# Patient Record
Sex: MALE | Race: WHITE | Employment: FULL TIME | URBAN - METROPOLITAN AREA
[De-identification: names, ages, dates, MRNs, and addresses within clinical notes are randomized per-mention and may not be internally consistent; named-entity substitution may affect disease eponyms.]

---

## 2017-11-15 ENCOUNTER — HOSPITAL ENCOUNTER (OUTPATIENT)
Age: 22
Discharge: HOME | End: 2017-11-15
Attending: FAMILY MEDICINE
Payer: COMMERCIAL

## 2017-11-15 VITALS
HEART RATE: 81 BPM | RESPIRATION RATE: 18 BRPM | HEIGHT: 75 IN | DIASTOLIC BLOOD PRESSURE: 69 MMHG | TEMPERATURE: 99 F | WEIGHT: 180 LBS | BODY MASS INDEX: 22.38 KG/M2 | SYSTOLIC BLOOD PRESSURE: 134 MMHG | OXYGEN SATURATION: 99 %

## 2017-11-15 DIAGNOSIS — R19.7 DIARRHEA, UNSPECIFIED TYPE: Primary | ICD-10-CM

## 2017-11-15 PROCEDURE — 99202 OFFICE O/P NEW SF 15 MIN: CPT

## 2017-11-16 NOTE — ED INITIAL ASSESSMENT (HPI)
Per pt having intermittent loose stools since July denies any travel. Pt reports bloating but denies any pain. Pt denies nausea or vomiting.

## 2017-11-16 NOTE — ED PROVIDER NOTES
[facility] [address] [address] [address]    History   Patient presents with:  Nausea/Vomiting/Diarrhea (gastrointestinal)    Stated Complaint: Diarrahea, insomnia, fatigue     HPI    Reports gradually onset of diarrhea 6 months ago. reactive to light. Lids are everted and swept, no foreign bodies found. Right eye exhibits no chemosis. Neck: Trachea normal, normal range of motion and full passive range of motion without pain. No thyromegaly present.    Cardiovascular: Normal rate, reg

## 2017-11-16 NOTE — ED NOTES
All orders complete pt leaving IC stable no acute distress noted pt verbalizes DC and follow up instructions

## 2017-11-21 ENCOUNTER — LAB ENCOUNTER (OUTPATIENT)
Dept: LAB | Facility: REFERENCE LAB | Age: 22
End: 2017-11-21
Attending: FAMILY MEDICINE
Payer: COMMERCIAL

## 2017-11-21 ENCOUNTER — OFFICE VISIT (OUTPATIENT)
Dept: FAMILY MEDICINE CLINIC | Facility: CLINIC | Age: 22
End: 2017-11-21

## 2017-11-21 VITALS
BODY MASS INDEX: 23.62 KG/M2 | HEART RATE: 84 BPM | OXYGEN SATURATION: 98 % | RESPIRATION RATE: 17 BRPM | TEMPERATURE: 98 F | WEIGHT: 190 LBS | HEIGHT: 75 IN | SYSTOLIC BLOOD PRESSURE: 130 MMHG | DIASTOLIC BLOOD PRESSURE: 62 MMHG

## 2017-11-21 DIAGNOSIS — R53.83 FATIGUE, UNSPECIFIED TYPE: ICD-10-CM

## 2017-11-21 DIAGNOSIS — G47.00 INSOMNIA, UNSPECIFIED TYPE: ICD-10-CM

## 2017-11-21 DIAGNOSIS — R10.9 ABDOMINAL PAIN, UNSPECIFIED ABDOMINAL LOCATION: ICD-10-CM

## 2017-11-21 DIAGNOSIS — R19.7 DIARRHEA, UNSPECIFIED TYPE: ICD-10-CM

## 2017-11-21 DIAGNOSIS — R19.7 DIARRHEA, UNSPECIFIED TYPE: Primary | ICD-10-CM

## 2017-11-21 PROCEDURE — 85652 RBC SED RATE AUTOMATED: CPT

## 2017-11-21 PROCEDURE — 86001 ALLERGEN SPECIFIC IGG: CPT

## 2017-11-21 PROCEDURE — 36415 COLL VENOUS BLD VENIPUNCTURE: CPT

## 2017-11-21 PROCEDURE — 82784 ASSAY IGA/IGD/IGG/IGM EACH: CPT

## 2017-11-21 PROCEDURE — 86628 CANDIDA ANTIBODY: CPT

## 2017-11-21 PROCEDURE — 82306 VITAMIN D 25 HYDROXY: CPT

## 2017-11-21 PROCEDURE — 80053 COMPREHEN METABOLIC PANEL: CPT

## 2017-11-21 PROCEDURE — 84443 ASSAY THYROID STIM HORMONE: CPT

## 2017-11-21 PROCEDURE — 99204 OFFICE O/P NEW MOD 45 MIN: CPT | Performed by: FAMILY MEDICINE

## 2017-11-21 PROCEDURE — 85025 COMPLETE CBC W/AUTO DIFF WBC: CPT

## 2017-11-21 PROCEDURE — 83516 IMMUNOASSAY NONANTIBODY: CPT

## 2017-11-21 NOTE — PROGRESS NOTES
Lamine Calderón is a 25year old male.     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 No pertinent surgical history.     PHYSICAL EXAM:      11/21/17  1133   BP: 130/62   BP Location: Left arm   Pulse: 84   Resp: 17   Temp: 98.3 °F (36.8 °C)   TempSrc: Oral   SpO2: 98%   Weight: 190 work and stool studies to further evaluate  IBD vs IBS vs infectious process (less likely)  Consider gluten or other food sensitivity. Follow up in 1 week to discuss  Referred to GI - will need a c-scope.        Orders Placed This Encounter      Allergen,

## 2017-11-22 ENCOUNTER — LAB ENCOUNTER (OUTPATIENT)
Dept: LAB | Facility: REFERENCE LAB | Age: 22
End: 2017-11-22
Attending: FAMILY MEDICINE
Payer: COMMERCIAL

## 2017-11-22 DIAGNOSIS — R19.7 DIARRHEA: Primary | ICD-10-CM

## 2017-11-22 DIAGNOSIS — R19.7 DIARRHEA, UNSPECIFIED TYPE: ICD-10-CM

## 2017-11-22 DIAGNOSIS — R53.83 FATIGUE, UNSPECIFIED TYPE: ICD-10-CM

## 2017-11-22 PROCEDURE — 89055 LEUKOCYTE ASSESSMENT FECAL: CPT

## 2017-11-22 PROCEDURE — 87177 OVA AND PARASITES SMEARS: CPT

## 2017-11-22 PROCEDURE — 87209 SMEAR COMPLEX STAIN: CPT

## 2017-11-22 PROCEDURE — 87045 FECES CULTURE AEROBIC BACT: CPT

## 2017-11-22 PROCEDURE — 87427 SHIGA-LIKE TOXIN AG IA: CPT

## 2017-11-22 PROCEDURE — 87507 IADNA-DNA/RNA PROBE TQ 12-25: CPT

## 2017-11-22 PROCEDURE — 82272 OCCULT BLD FECES 1-3 TESTS: CPT

## 2017-11-22 PROCEDURE — 87046 STOOL CULTR AEROBIC BACT EA: CPT

## 2017-11-28 ENCOUNTER — OFFICE VISIT (OUTPATIENT)
Dept: FAMILY MEDICINE CLINIC | Facility: CLINIC | Age: 22
End: 2017-11-28

## 2017-11-28 VITALS
RESPIRATION RATE: 18 BRPM | WEIGHT: 189 LBS | DIASTOLIC BLOOD PRESSURE: 78 MMHG | HEART RATE: 82 BPM | OXYGEN SATURATION: 98 % | TEMPERATURE: 98 F | BODY MASS INDEX: 23.5 KG/M2 | HEIGHT: 75 IN | SYSTOLIC BLOOD PRESSURE: 130 MMHG

## 2017-11-28 DIAGNOSIS — R79.89 LOW VITAMIN D LEVEL: ICD-10-CM

## 2017-11-28 DIAGNOSIS — K58.0 IRRITABLE BOWEL SYNDROME WITH DIARRHEA: ICD-10-CM

## 2017-11-28 DIAGNOSIS — R19.7 DIARRHEA, UNSPECIFIED TYPE: Primary | ICD-10-CM

## 2017-11-28 DIAGNOSIS — G47.00 INSOMNIA, UNSPECIFIED TYPE: ICD-10-CM

## 2017-11-28 DIAGNOSIS — T78.1XXA FOOD SENSITIVITY WITH GASTROINTESTINAL SYMPTOMS: ICD-10-CM

## 2017-11-28 PROCEDURE — 99214 OFFICE O/P EST MOD 30 MIN: CPT | Performed by: FAMILY MEDICINE

## 2017-11-28 NOTE — PROGRESS NOTES
Martina Hernandez is a 25year old male. Patient presents with:  Lab Results      HPI:   Here for follow up. Since last week his diarrhea has improved as has his insomnia.   He started a probiotic about 1 week ago, picked one up from [employer] with lactobac Pulmonary/Chest: Effort normal.   Musculoskeletal: He exhibits no edema or deformity. Neurological: He is alert and oriented to person, place, and time. Skin: Skin is warm and dry. Psychiatric: Affect normal.       ASSESSMENT AND PLAN:   1.  Diarrhea, - online: Bisi Rice Floramend Prime Probiotic, Prescript Assist    Restore supplement for gut health. This is a carbon, soil-based product that helps to rebuild the tight junctions, orimportant connections between cells, in the gut.  These tight junct · If you have specific Yazidism beliefs, prayer can be a form of meditation. Repeating prayers or mantras can instill a sense of peace (use a rosary bead or kayce). · Gratitude can instill a sense of peace.  Keep a gratitude journal and write down at leas The Miracle of Mindfulness by Flavia Gardner    Check out these resources on how meditation and deep breathing can transform your mind and body:    [email or web address]/. com/sites/drake/2015/02/09/6-nbml-wrseeccbco-can-actually-change-the-brain/    htt

## 2017-11-28 NOTE — PATIENT INSTRUCTIONS
The products and items listed below (the “Products”)  and their claims have not been evaluated by the Food and Drug Administration. Dietary products are not intended to treat, prevent, mitigate or cure disease.  Ultimately, you must draw your own conclusion Vitamin D is an important backbone for many hormones and neurotransmitters. It is important for energy and mood. I recommend taking 4000 units daily.   Some good sources:  - in Health food stores: [employer] or Dr. Keo roldan  - online: Mariza Kelsey Start with 5 minutes per day and increase amount of time spent in meditation gradually. Conscious breathing: Breathe in through your nose for 6 seconds, then out through your nose for 6 seconds.  Create a cycle with your breathing, where the end of the [email or web address]/meditation

## 2018-04-04 ENCOUNTER — CHARTING TRANS (OUTPATIENT)
Dept: OTHER | Age: 23
End: 2018-04-04

## 2018-04-04 ENCOUNTER — LAB SERVICES (OUTPATIENT)
Dept: OTHER | Age: 23
End: 2018-04-04

## 2018-04-04 LAB — RAPID STREP GROUP A: POSITIVE

## 2018-07-25 ENCOUNTER — CHARTING TRANS (OUTPATIENT)
Dept: OTHER | Age: 23
End: 2018-07-25

## 2018-10-31 VITALS
OXYGEN SATURATION: 98 % | DIASTOLIC BLOOD PRESSURE: 58 MMHG | RESPIRATION RATE: 16 BRPM | SYSTOLIC BLOOD PRESSURE: 120 MMHG | TEMPERATURE: 98.3 F | HEART RATE: 65 BPM

## 2018-11-01 VITALS
HEART RATE: 83 BPM | TEMPERATURE: 97.9 F | DIASTOLIC BLOOD PRESSURE: 76 MMHG | RESPIRATION RATE: 17 BRPM | SYSTOLIC BLOOD PRESSURE: 118 MMHG | BODY MASS INDEX: 21.76 KG/M2 | HEIGHT: 75 IN | WEIGHT: 175 LBS | OXYGEN SATURATION: 98 %